# Patient Record
Sex: MALE
[De-identification: names, ages, dates, MRNs, and addresses within clinical notes are randomized per-mention and may not be internally consistent; named-entity substitution may affect disease eponyms.]

---

## 2021-09-27 ENCOUNTER — APPOINTMENT (OUTPATIENT)
Dept: ENDOCRINOLOGY | Facility: CLINIC | Age: 31
End: 2021-09-27
Payer: COMMERCIAL

## 2021-09-27 VITALS
BODY MASS INDEX: 24 KG/M2 | WEIGHT: 193 LBS | SYSTOLIC BLOOD PRESSURE: 115 MMHG | DIASTOLIC BLOOD PRESSURE: 75 MMHG | TEMPERATURE: 96.7 F | HEART RATE: 94 BPM | HEIGHT: 75 IN

## 2021-09-27 DIAGNOSIS — Z78.9 OTHER SPECIFIED HEALTH STATUS: ICD-10-CM

## 2021-09-27 DIAGNOSIS — Z82.49 FAMILY HISTORY OF ISCHEMIC HEART DISEASE AND OTHER DISEASES OF THE CIRCULATORY SYSTEM: ICD-10-CM

## 2021-09-27 DIAGNOSIS — R16.0 HEPATOMEGALY, NOT ELSEWHERE CLASSIFIED: ICD-10-CM

## 2021-09-27 DIAGNOSIS — Z83.49 FAMILY HISTORY OF OTHER ENDOCRINE, NUTRITIONAL AND METABOLIC DISEASES: ICD-10-CM

## 2021-09-27 DIAGNOSIS — Z87.898 PERSONAL HISTORY OF OTHER SPECIFIED CONDITIONS: ICD-10-CM

## 2021-09-27 DIAGNOSIS — R74.8 ABNORMAL LEVELS OF OTHER SERUM ENZYMES: ICD-10-CM

## 2021-09-27 DIAGNOSIS — Z86.19 PERSONAL HISTORY OF OTHER INFECTIOUS AND PARASITIC DISEASES: ICD-10-CM

## 2021-09-27 PROCEDURE — 99072 ADDL SUPL MATRL&STAF TM PHE: CPT

## 2021-09-27 PROCEDURE — 99204 OFFICE O/P NEW MOD 45 MIN: CPT

## 2021-09-27 RX ORDER — METOPROLOL TARTRATE 25 MG/1
25 TABLET, FILM COATED ORAL DAILY
Qty: 30 | Refills: 4 | Status: ACTIVE | COMMUNITY
Start: 2021-09-27

## 2021-09-27 NOTE — ASSESSMENT
[FreeTextEntry1] : Patient is a 31 yo man establishing care for hyperthyroidism.\par \par 1.  Hyperthyroidism\par -differential diagnosis includes Grave's Disease, toxic nodule and thyroiditis to name a few. I suspect this patient either has Grave's Disease or thyroiditis from recent illness\par -no evidence of Grave's eye disease, no goiter, no enlarged thyroid\par -check TSH receptor antibody levels today, TSH, total T3 and free T4\par -heart rate goal of 70-80; start metoprolol 25 mg daily\par -educated to avoid contrast dye, homeopathic/alternative medicines and amiodarone\par -discussed treatment options based on diagnosis.  If Grave's Disease/autoimmune hyperthyroidism is confirmed, management includes oral thionamides, SWENSON and/or surgery\par -side effects of methimazole include agranulocytosis and elevation in liver enzymes.  If fever/sore throat/jaundice develop, patient to contact doctor\par -labs from 8/17 and 8/24 reviewed. Free T4 has improved with time. TSH remains low. Repeat all levels today\par \par Follow up in 4 weeks\par

## 2021-09-27 NOTE — HISTORY OF PRESENT ILLNESS
[FreeTextEntry1] : Patient is a 31 yo man establishing endocrine care for hyperthyroidism\par \par Patient returned from a trip to [place] and [place] and returned 2 months ago.  States he had bad diarrhea while travelling, went to the doctor in [facility] who prescribed antibiotics.  Feels the antibiotics inflamed liver and gall bladder.   Upon arrival to to the [place], he followed up with PCP who checked labs and told him he had hyperthyroidism.  He has a history of hepatitis A at age 17 years.  States since 17 years of age, liver function has been fine.  There was some excessive drinking while in [place].  Usually on Friday-Saturdays, can get "carried away."  Denies history of ETOH dependence.  States some anxiety and fast heart rate since returning from [place].  Denies symptoms prior to his travels.  Worls out regularly but did have some weight loss after the diarrhea episode.  Right now, is his normal rate\par Mother: history of hypothyroid\par Brother: hyperthyroidism at some point, used to take pills but now normal\par No exposures to lithium/amiodarone/radiation therapy\par Stopped taking supplements.  Was taking oil of oregano, vitamin D, B12, calcium  as well.  Used to take vegan protein shake and L-arginine.  All supplements stopped two months ago.  \par No exposures to iodine/CT scan\par \par 8/24/21\par TSH \par Free T4 1.9\par TG antibodies 568\par TPO > 900\par \par 8/17/21\par TSH <0.01\par Free T4 2.2\par Alkaline phosphatase 141

## 2021-09-27 NOTE — CONSULT LETTER
[Dear  ___] : Dear  [unfilled], [Consult Letter:] : I had the pleasure of evaluating your patient, [unfilled]. [Please see my note below.] : Please see my note below. [Consult Closing:] : Thank you very much for allowing me to participate in the care of this patient.  If you have any questions, please do not hesitate to contact me. [Sincerely,] : Sincerely, [FreeTextEntry3] : Gloria Kuo MD

## 2021-09-27 NOTE — PHYSICAL EXAM
[Alert] : alert [Well Nourished] : well nourished [No Acute Distress] : no acute distress [EOMI] : extra ocular movement intact [Normal Hearing] : hearing was normal [Thyroid Not Enlarged] : the thyroid was not enlarged [No Respiratory Distress] : no respiratory distress [No Accessory Muscle Use] : no accessory muscle use [Clear to Auscultation] : lungs were clear to auscultation bilaterally [Normal S1, S2] : normal S1 and S2 [Normal Bowel Sounds] : normal bowel sounds [Not Tender] : non-tender [Soft] : abdomen soft [Normal Gait] : normal gait [No Joint Swelling] : no joint swelling seen [Normal Strength/Tone] : muscle strength and tone were normal [No Motor Deficits] : the motor exam was normal [Normal Affect] : the affect was normal [Normal Insight/Judgement] : insight and judgment were intact [Normal Mood] : the mood was normal [de-identified] : HR 94 [de-identified] : Mild tremors

## 2021-09-27 NOTE — REASON FOR VISIT
[Initial Evaluation] : an initial evaluation [Hyperthyroidism] : hyperthyroidism [FreeTextEntry2] : Dr. Leif Valadez [address] [place] [address]

## 2021-09-27 NOTE — REVIEW OF SYSTEMS
[Fatigue] : fatigue [Decreased Appetite] : appetite not decreased [Recent Weight Loss (___ Lbs)] : recent weight loss: [unfilled] lbs [Dysphagia] : no dysphagia [Dysphonia] : no dysphonia [Chest Pain] : no chest pain [Slow Heart Rate] : heart rate is not slow [Palpitations] : palpitations [Fast Heart Rate] : fast heart rate [Shortness Of Breath] : no shortness of breath [Cough] : no cough [As Noted in HPI] : as noted in HPI [Nausea] : no nausea [Vomiting] : no vomiting [Diarrhea] : diarrhea [Headaches] : no headaches [Tremors] : no tremors [Depression] : no depression [Anxiety] : anxiety

## 2021-09-28 LAB
T3 SERPL-MCNC: 190 NG/DL
T4 FREE SERPL-MCNC: 2.1 NG/DL
TSH SERPL-ACNC: <0.01 UIU/ML

## 2021-09-30 LAB
THYROGLOB AB SERPL-ACNC: ABNORMAL IU/ML
THYROPEROXIDASE AB SERPL IA-ACNC: 7360 IU/ML
TSH RECEPTOR AB: 320 IU/L
TSI ACT/NOR SER: 385 IU/L

## 2021-09-30 RX ORDER — METHIMAZOLE 5 MG/1
5 TABLET ORAL DAILY
Qty: 30 | Refills: 2 | Status: ACTIVE | COMMUNITY
Start: 2021-09-30 | End: 1900-01-01

## 2021-10-18 DIAGNOSIS — E05.90 THYROTOXICOSIS, UNSPECIFIED W/OUT THYROTOXIC CRISIS OR STORM: ICD-10-CM

## 2021-10-26 ENCOUNTER — NON-APPOINTMENT (OUTPATIENT)
Age: 31
End: 2021-10-26

## 2021-10-26 LAB
ALBUMIN SERPL ELPH-MCNC: 4.6 G/DL
ALP BLD-CCNC: 234 U/L
ALT SERPL-CCNC: 22 U/L
ANION GAP SERPL CALC-SCNC: 13 MMOL/L
AST SERPL-CCNC: 20 U/L
BILIRUB SERPL-MCNC: 0.2 MG/DL
BUN SERPL-MCNC: 12 MG/DL
CALCIUM SERPL-MCNC: 9.3 MG/DL
CHLORIDE SERPL-SCNC: 104 MMOL/L
CO2 SERPL-SCNC: 20 MMOL/L
CREAT SERPL-MCNC: 0.83 MG/DL
GLUCOSE SERPL-MCNC: 102 MG/DL
POTASSIUM SERPL-SCNC: 4.1 MMOL/L
PROT SERPL-MCNC: 7.6 G/DL
SODIUM SERPL-SCNC: 136 MMOL/L
T3 SERPL-MCNC: 130 NG/DL
T4 FREE SERPL-MCNC: 1.3 NG/DL
TSH SERPL-ACNC: <0.01 UIU/ML

## 2021-10-27 ENCOUNTER — NON-APPOINTMENT (OUTPATIENT)
Age: 31
End: 2021-10-27

## 2021-10-27 LAB — TSH RECEPTOR AB: 346 IU/L

## 2021-10-28 ENCOUNTER — APPOINTMENT (OUTPATIENT)
Dept: ENDOCRINOLOGY | Facility: CLINIC | Age: 31
End: 2021-10-28

## 2021-10-28 ENCOUNTER — NON-APPOINTMENT (OUTPATIENT)
Age: 31
End: 2021-10-28

## 2021-10-28 LAB — TSI ACT/NOR SER: 388 IU/L

## 2021-10-29 LAB
THYROGLOB AB SERPL-ACNC: ABNORMAL IU/ML
THYROPEROXIDASE AB SERPL IA-ACNC: 8509 IU/ML

## 2021-11-04 ENCOUNTER — NON-APPOINTMENT (OUTPATIENT)
Age: 31
End: 2021-11-04

## 2023-01-23 ENCOUNTER — APPOINTMENT (OUTPATIENT)
Dept: UROLOGY | Facility: CLINIC | Age: 33
End: 2023-01-23